# Patient Record
(demographics unavailable — no encounter records)

---

## 2024-11-08 NOTE — HISTORY OF PRESENT ILLNESS
[FreeTextEntry1] : Language: English Accompanied by: Self Contact info: [phone] Referring Provider/PCP: Silvia Davis    Initial H&P 03/29/2024: Mr. Casey is a very pleasant 40-year-old gentleman who presents today for initial evaluation of possible kidney stones.   About 1 month ago, had penile pain as well as lower back pain. Felt "weird penile pain" for 1.5 weeks, and then it resolved.  No flank pain.  Denies increased frequency, urgency.  Had increased pressure.  Also had associated dysuria.  Underwent STI testing, which was negative.   urine pH 8.0, many ca-ox crystals, 3-5RBCs  Also c/o premature ejaculation and ED.  Achieves firm erection but does not last as long and requires more stimulation than before.  Has very firm erections in the middle of the night.  Pt thinks it may be performance anxiety.    Has tried blue chew, which was effective.  --------------------------------------------------------------------------------------------------------------------------------------- Interval History 11/08/24: Presents today for f/u. Last seen in the office 05/15/2024.   RBUS (4/24) was neg for hydro or UVJ stones. Prostate was 21cc and PVR was 22cc.   Was previously prescribed tadalafil and paxil.  Discussed possibility of psychogenic component to ED due to anxiety around PE.  Previously had issues filling the Rx.   Today, he states  --------------------------------------------------------------------------------------------------------------------------------------- PMHx: None PSHx: None SHx: smokes e cigs, denies x2,  FHx: 2 uncles - prostate cancer, otherwise no known malignancies    All: NKDA  --------------------------------------------------------------------------------------------------------------------------------------- Physical Exam: General: NAD, sitting on exam table comfortably HEENT: NCAT, EOMI Resp: breathing comfortably on RA, b/l symm chest rise Cardiac: RRR Abd: NATHANIEL Back: (-) CVAT b/l MSK: SARAH ram/ FROM Psych: appropriate affect --------------------------------------------------------------------------------------------------------------------------------------- Results: PSA 03/05/2024 0.19   RBUS 4/24/24 ([facility]): no hydro, (+) jets bilaterally, PVR 22, prostate vol 21cc --------------------------------------------------------------------------------------------------------------------------------------- A/P: 41M with prior lower back pain c/f nephrolithiasis, premature ejaculation and ED. RBUS previously negative.   1. Nephrolithiasis Previously ruled out. Discussed MSK as likely cause of pain.   2. Premature Ejaculation Cont Paxil  3. ED Cont Tadalafil  4. Health Maintenance Cont annual PSA checks with primary.   I have answered all of his questions today, and I will see him for f/u in   Plan: - Paxil - Tadalafil - RTC 6 months or sooner PRN (TH visit)  I spent a total of 22 minutes on face-to-face counseling, coordination of care, review of prior records and clinical documentation.